# Patient Record
Sex: FEMALE | Race: WHITE | NOT HISPANIC OR LATINO | URBAN - METROPOLITAN AREA
[De-identification: names, ages, dates, MRNs, and addresses within clinical notes are randomized per-mention and may not be internally consistent; named-entity substitution may affect disease eponyms.]

---

## 2018-01-23 NOTE — PATIENT DISCUSSION
(H25.13) Age-related nuclear cataract, bilateral - Assesment : Examination revealed cataract.  OU: 2.5+ [facility] BRUNESCENT  PATIENT LIKES TO PLAY TENNIS AND WATCHES TV.  RECOMMEND FOCUSING OU AT DISTANCE AND WEAR GLASSES FOR READING. - Plan : SX COUNSELING. -[address] [facility]

## 2018-01-23 NOTE — PATIENT DISCUSSION
(H35.373) Puckering of macula, bilateral - Assesment : Examination revealed ERM. DISCUSSED DX WITH PATIENT. ADVISED PATIENT MAY SEE SOME DISTORTION AFTER CATARACT SURGERY DUE TO ERM. INCREASED CLARITY AFTER CATARACT SURGERY MAY REVEAL SOME DISTORTION. - Plan : Monitor. Advised patient to call our office with decreased vision or increased symptoms.

## 2018-01-23 NOTE — PATIENT DISCUSSION
(Q02.529) Vitreous degeneration, bilateral - Assesment : Examination revealed PVD - Plan : Monitor for changes. Advised patient to call our office with decreased vision or an increase in flashes and/or floaters.

## 2018-04-23 NOTE — PATIENT DISCUSSION
(Z96.1) Presence of intraocular lens - Assesment : Patient is Pseudophakic. - Plan : Signs and symptoms of infection and retinal detachment are outlined in your surgical packet. Do not rub operated eye. Follow drop schedule. If redness, pain, decreased vision, flashes or floaters occur then contact clinic.  Recommend artificial tears 3-4 times a day Rtc 01/2019 Exam-octm

## 2018-05-18 NOTE — PATIENT DISCUSSION
REASSURANCE GIVEN, NO FB NOTED,  NOTICING CHEMOSIS AND PING. MOVING AROUND. LOTEMAX QID 1 WEEK TO CALM THINGS DOWN.   CALLBACK INSTRUCTIONS GIVEN

## 2018-10-31 ENCOUNTER — IMPORTED ENCOUNTER (OUTPATIENT)
Dept: URBAN - METROPOLITAN AREA CLINIC 31 | Facility: CLINIC | Age: 73
End: 2018-10-31

## 2018-10-31 PROBLEM — H25.813: Noted: 2018-10-31

## 2018-10-31 PROBLEM — H04.123: Noted: 2018-10-31

## 2018-10-31 PROBLEM — E10.9: Noted: 2018-10-31

## 2018-10-31 PROCEDURE — 92014 COMPRE OPH EXAM EST PT 1/>: CPT

## 2018-10-31 NOTE — PATIENT DISCUSSION
1.  DM I without sign of Diabetic Retinopathy OU:  Discussed the pathophysiology of diabetes and its effect on the eye. Stressed the importance of regular followup and good control of BS BP and Lipids to avoid future complications. 2. Combined Types of Cataract OU: Explained how cataracts can effect vision. Recommend clinical observation. The patient was advised to contact us if any change or worsening of vision. 3. Dry Eye OU:  Continue current management with Artificial Tears. 4.  Return for an appointment for comprehensive exam. with Dr. Jaye Mendez.

## 2019-01-24 NOTE — PATIENT DISCUSSION
(H35.373) Puckering of macula, bilateral - Assesment : Examination revealed ERM. - Plan : Monitor. Advised patient to call our office with decreased vision or increased symptoms. WILL TRY GLASSES FOR NIGHT DRIVING.   NEW RX GIVEN TO PATIENT TODAY  1 YEAR EXAM

## 2019-03-26 ENCOUNTER — IMPORTED ENCOUNTER (OUTPATIENT)
Dept: URBAN - METROPOLITAN AREA CLINIC 31 | Facility: CLINIC | Age: 74
End: 2019-03-26

## 2019-03-26 PROBLEM — H11.32: Noted: 2019-03-26

## 2019-03-26 PROBLEM — E11.9: Noted: 2019-03-26

## 2019-03-26 PROBLEM — H25.13: Noted: 2019-03-26

## 2019-03-26 PROCEDURE — 99214 OFFICE O/P EST MOD 30 MIN: CPT

## 2019-03-26 NOTE — PATIENT DISCUSSION
1.  1.  Subconjunctival hemorrhage OS --The condition was discussed with patient and the patient was reassured. The patient was asked to notify his family physician should he have increased bleeding or bruising elsewhere or recurrent subconjunctival hemorrhages. 2. Nuclear Sclerotic Cataract OU: Explained how cataracts can effect vision. Recommend clinical observation. The patient was advised to contact us if any change or worsening of vision. 3.  DM II without sign of Diabetic Retinopathy OU:  Discussed the pathophysiology of diabetes and its effect on the eye. Stressed the importance of regular followup and good control of BS BP and Lipids to avoid future complications. /u with Malena Chao in few months CE

## 2019-07-11 NOTE — PATIENT DISCUSSION
(Z96.1) Presence of intraocular lens - Assesment : Patient is Pseudophakic. - Plan : Signs and symptoms of infection and retinal detachment are outlined in your surgical packet. Do not rub operated eye. Follow drop schedule. If redness, pain, decreased vision, flashes or floaters occur then contact clinic.  ATS OS FOR FB SENSATION PRN  1 MONTH/ REFRACT/ MAC OCT
no

## 2019-11-04 ENCOUNTER — IMPORTED ENCOUNTER (OUTPATIENT)
Dept: URBAN - METROPOLITAN AREA CLINIC 31 | Facility: CLINIC | Age: 74
End: 2019-11-04

## 2019-11-04 PROBLEM — H25.13: Noted: 2019-11-04

## 2019-11-04 PROBLEM — E10.9: Noted: 2019-11-04

## 2019-11-04 PROBLEM — H04.123: Noted: 2019-11-04

## 2019-11-04 PROCEDURE — 92014 COMPRE OPH EXAM EST PT 1/>: CPT

## 2019-11-04 NOTE — PATIENT DISCUSSION
1.  DM I without sign of Diabetic Retinopathy OU:  Discussed the pathophysiology of diabetes and its effect on the eye. Stressed the importance of regular followup and good control of BS BP and Lipids to avoid future complications. 2. Nuclear Sclerotic Cataract OU: Explained how cataracts can effect vision. Recommend clinical observation. The patient was advised to contact us if any change or worsening of vision. 3. Dry Eye OU:  Continue current management with Artificial Tears. 4.  Return for an appointment in 1 year for comprehensive exam. with Dr. Douglas Barrios.

## 2020-11-04 ENCOUNTER — IMPORTED ENCOUNTER (OUTPATIENT)
Dept: URBAN - METROPOLITAN AREA CLINIC 31 | Facility: CLINIC | Age: 75
End: 2020-11-04

## 2020-11-04 PROBLEM — E10.9: Noted: 2020-11-04

## 2020-11-04 PROBLEM — H04.123: Noted: 2020-11-04

## 2020-11-04 PROBLEM — H25.13: Noted: 2020-11-04

## 2020-11-04 PROCEDURE — 92015 DETERMINE REFRACTIVE STATE: CPT

## 2020-11-04 PROCEDURE — 92014 COMPRE OPH EXAM EST PT 1/>: CPT

## 2020-11-04 NOTE — PATIENT DISCUSSION
1.  DM I without sign of Diabetic Retinopathy OU:  Stable monitor. 2. Nuclear Sclerotic Cataract OU: Monitor. 3. Dry Eye OU:  Continue current management with Artificial Tears. 4.  Return for an appointment in 1 year for comprehensive exam and Optos with Dr. Dorian Díaz.

## 2021-03-04 ENCOUNTER — IMPORTED ENCOUNTER (OUTPATIENT)
Dept: URBAN - METROPOLITAN AREA CLINIC 31 | Facility: CLINIC | Age: 76
End: 2021-03-04

## 2021-03-04 PROBLEM — H04.123: Noted: 2021-03-04

## 2021-03-04 PROBLEM — H25.13: Noted: 2021-03-04

## 2021-03-04 PROBLEM — H00.025: Noted: 2021-03-04

## 2021-03-04 PROBLEM — E10.9: Noted: 2021-03-04

## 2021-03-04 PROCEDURE — 99213 OFFICE O/P EST LOW 20 MIN: CPT

## 2021-03-04 NOTE — PATIENT DISCUSSION
1.  DM I without sign of Diabetic Retinopathy OU:  Stable monitor. 2. Nuclear Sclerotic Cataract OU: Monitor. 3. Dry Eye OU:  Continue current management with Artificial Tears. 4.  Return for an appointment in 8 months for comprehensive exam and Optos with Dr. Angelo Low.

## 2021-03-04 NOTE — PATIENT DISCUSSION
1.  Internal hordeolum LLL nasally - WC massage QID. If not resolved in 3 weeks or any worsening to call.2.  1.  DM I without sign of Diabetic Retinopathy OU:  Stable monitor. 2. Nuclear Sclerotic Cataract OU: Monitor. 3. Dry Eye OU:  Continue current management with Artificial Tears. 4.  Return for an appointment in 8 months for comprehensive exam and Optos with Dr. Jeovany Wade.

## 2021-11-04 ENCOUNTER — IMPORTED ENCOUNTER (OUTPATIENT)
Dept: URBAN - METROPOLITAN AREA CLINIC 31 | Facility: CLINIC | Age: 76
End: 2021-11-04

## 2021-11-04 PROBLEM — H00.025: Noted: 2021-11-04

## 2021-11-04 PROBLEM — H25.13: Noted: 2021-11-04

## 2021-11-04 PROBLEM — H04.123: Noted: 2021-11-04

## 2021-11-04 PROBLEM — E10.9: Noted: 2021-11-04

## 2021-11-04 PROCEDURE — 99214 OFFICE O/P EST MOD 30 MIN: CPT

## 2021-11-04 PROCEDURE — 92250 FUNDUS PHOTOGRAPHY W/I&R: CPT

## 2021-11-04 PROCEDURE — 92015 DETERMINE REFRACTIVE STATE: CPT

## 2021-11-04 NOTE — PATIENT DISCUSSION
1.  DM I without sign of Diabetic Retinopathy OU:  Stable monitor. 2. Nuclear Sclerotic Cataract OU: Progressing. Monitor. 3. Dry Eye OU:  Continue current management with Artificial Tears. 4. Refractive error - update glasses. 5.  Return for an appointment in 12 months for comprehensive exam and Optos with Dr. Stacey Vamra.

## 2021-11-17 ENCOUNTER — IMPORTED ENCOUNTER (OUTPATIENT)
Dept: URBAN - METROPOLITAN AREA CLINIC 31 | Facility: CLINIC | Age: 76
End: 2021-11-17

## 2021-11-17 PROBLEM — H25.13: Noted: 2021-11-17

## 2021-11-17 PROBLEM — E10.9: Noted: 2021-11-17

## 2021-11-17 PROBLEM — H04.123: Noted: 2021-11-17

## 2021-11-17 PROBLEM — H11.32: Noted: 2021-11-17

## 2021-11-17 PROBLEM — H25.12: Noted: 2021-11-17

## 2021-11-17 PROBLEM — H00.025: Noted: 2021-11-17

## 2021-11-17 PROBLEM — H25.11: Noted: 2021-11-17

## 2021-11-17 PROCEDURE — 99213 OFFICE O/P EST LOW 20 MIN: CPT

## 2021-11-17 NOTE — PATIENT DISCUSSION
1.  Subconjunctival hemorrhage OS --The condition was discussed with patient and the patient was reassured. The patient was asked to notify his family physician should he have increased bleeding or bruising elsewhere or recurrent subconjunctival hemorrhages. 2. DM I without sign of Diabetic Retinopathy OU:  Stable monitor. 3. Nuclear Sclerotic Cataract OU: Progressing. Monitor. 4. Dry Eye OU:  Continue current management with Artificial Tears. 5. Return for an appointment in 12 months for comprehensive exam and Optos with Dr. Rei Lara.

## 2021-11-17 NOTE — PATIENT DISCUSSION
1.  DM I without sign of Diabetic Retinopathy OU:  Stable monitor. 2. Nuclear Sclerotic Cataract OU: Progressing. Monitor. 3. Dry Eye OU:  Continue current management with Artificial Tears. 5. Return for an appointment in 12 months for comprehensive exam and Optos with Dr. Celine Armendariz.

## 2022-03-10 NOTE — PROCEDURE NOTE: CLINICAL
PROCEDURE NOTE: Punctal Plugs, Quintess Dissolvable ([id number], [id number]) Bilateral Upper Lids. Diagnosis: Dry Eye Syndrome. Prior to treatment, the risks/benefits/alternatives were discussed. The patient wished to proceed with procedure. Temporary collagen plugs were inserted. Patient tolerated procedure well. There were no complications. Post procedure instructions given. Candida Chow

## 2022-03-10 NOTE — PATIENT DISCUSSION
(H35.373) Puckering of macula, bilateral - Assesment : Examination revealed ERM. - Plan : Monitor. Advised patient to call our office with decreased vision or increased symptoms. WILL TRY GLASSES FOR NIGHT DRIVING.   NEW RX GIVEN TO PATIENT TODAY  1 YEAR EXAM.

## 2022-04-02 ASSESSMENT — VISUAL ACUITY
OS_SC: 20/40+2
OD_SC: 20/40+1
OS_SC: 20/30
OS_SC: 20/40+1
OD_SC: 20/20-2
OS_PH: CC 20/25 +2
OS_GLARE: 20/800MED
OD_PH: CC 20/25 -3
OS_SC: 20/30+2
OD_GLARE: 20/800MED
OD_SC: 20/20
OD_SC: 20/30+1
OS_SC: 20/25-3
OS_SC: 20/30-2
OD_SC: 20/25-1
OD_SC: 20/20-2
OS_PH: CC 20/30 +2

## 2022-04-02 ASSESSMENT — TONOMETRY
OS_IOP_MMHG: 13
OD_IOP_MMHG: 14
OS_IOP_MMHG: 14
OD_IOP_MMHG: 14
OD_IOP_MMHG: 14
OS_IOP_MMHG: 16
OS_IOP_MMHG: 14
OS_IOP_MMHG: 14
OD_IOP_MMHG: 15
OD_IOP_MMHG: 17

## 2022-07-09 ENCOUNTER — TELEPHONE ENCOUNTER (OUTPATIENT)
Dept: URBAN - METROPOLITAN AREA CLINIC 121 | Facility: CLINIC | Age: 77
End: 2022-07-09

## 2022-07-10 ENCOUNTER — TELEPHONE ENCOUNTER (OUTPATIENT)
Dept: URBAN - METROPOLITAN AREA CLINIC 121 | Facility: CLINIC | Age: 77
End: 2022-07-10

## 2022-07-10 RX ORDER — CALCIUM CARB/VIT D2/MINERALS
TABLET ORAL ONCE A DAY
Refills: 0 | Status: ACTIVE | COMMUNITY
Start: 2017-07-10

## 2022-07-10 RX ORDER — ACETAMINOPHEN, DEXTROMETHORPHAN HBR, DOXYLAMINE SUCCINATE, PHENYLEPHRINE HCL 10-5-325MG
KIT ORAL THREE TIMES A DAY
Refills: 0 | Status: ACTIVE | COMMUNITY
Start: 2017-07-10

## 2022-07-10 RX ORDER — INSULIN ASPART 100 [IU]/ML
5 UNITS TID INJECTION, SUSPENSION SUBCUTANEOUS
Refills: 0 | Status: ACTIVE | COMMUNITY
Start: 2017-07-10

## 2022-07-10 RX ORDER — FOSINOPRIL SODIUM 40 MG/1
TABLET ORAL ONCE A DAY
Refills: 0 | Status: ACTIVE | COMMUNITY
Start: 2017-07-10

## 2022-07-10 RX ORDER — METFORMIN HCL 500 MG/1
TABLET ORAL ONCE A DAY
Refills: 0 | Status: ACTIVE | COMMUNITY
Start: 2017-07-10

## 2022-07-10 RX ORDER — INSULIN GLARGINE 100 [IU]/ML
15 UNITS QH INJECTION, SOLUTION SUBCUTANEOUS
Refills: 0 | Status: ACTIVE | COMMUNITY
Start: 2017-07-10

## 2022-07-10 RX ORDER — ASPIRIN 81 MG/1
TABLET, COATED ORAL ONCE A DAY
Refills: 0 | Status: ACTIVE | COMMUNITY
Start: 2017-07-10

## 2022-10-26 ENCOUNTER — PREPPED CHART (OUTPATIENT)
Dept: URBAN - METROPOLITAN AREA CLINIC 29 | Facility: CLINIC | Age: 77
End: 2022-10-26

## 2022-11-03 ENCOUNTER — COMPREHENSIVE EXAM (OUTPATIENT)
Dept: URBAN - METROPOLITAN AREA CLINIC 29 | Facility: CLINIC | Age: 77
End: 2022-11-03

## 2022-11-03 DIAGNOSIS — E10.9: ICD-10-CM

## 2022-11-03 DIAGNOSIS — H04.123: ICD-10-CM

## 2022-11-03 DIAGNOSIS — H25.13: ICD-10-CM

## 2022-11-03 DIAGNOSIS — H52.223: ICD-10-CM

## 2022-11-03 DIAGNOSIS — H52.03: ICD-10-CM

## 2022-11-03 PROCEDURE — 99214 OFFICE O/P EST MOD 30 MIN: CPT

## 2022-11-03 PROCEDURE — 92015 DETERMINE REFRACTIVE STATE: CPT

## 2022-11-03 ASSESSMENT — TONOMETRY
OS_IOP_MMHG: 12
OD_IOP_MMHG: 11

## 2022-11-03 ASSESSMENT — VISUAL ACUITY
OD_CC: 20/20-2
OS_CC: 20/20-1

## 2024-01-09 ENCOUNTER — COMPREHENSIVE EXAM (OUTPATIENT)
Dept: URBAN - METROPOLITAN AREA CLINIC 29 | Facility: CLINIC | Age: 79
End: 2024-01-09

## 2024-01-09 DIAGNOSIS — H52.03: ICD-10-CM

## 2024-01-09 DIAGNOSIS — H04.123: ICD-10-CM

## 2024-01-09 DIAGNOSIS — H25.13: ICD-10-CM

## 2024-01-09 DIAGNOSIS — E10.9: ICD-10-CM

## 2024-01-09 DIAGNOSIS — H52.223: ICD-10-CM

## 2024-01-09 PROCEDURE — 92014 COMPRE OPH EXAM EST PT 1/>: CPT

## 2024-01-09 PROCEDURE — 92015 DETERMINE REFRACTIVE STATE: CPT

## 2024-01-09 ASSESSMENT — TONOMETRY
OD_IOP_MMHG: 13
OS_IOP_MMHG: 14

## 2024-01-09 ASSESSMENT — VISUAL ACUITY
OS_SC: 20/30-2
OD_SC: 20/30-2

## 2025-01-09 ENCOUNTER — COMPREHENSIVE EXAM (OUTPATIENT)
Age: 80
End: 2025-01-09

## 2025-01-09 DIAGNOSIS — H52.03: ICD-10-CM

## 2025-01-09 DIAGNOSIS — H04.123: ICD-10-CM

## 2025-01-09 DIAGNOSIS — E10.9: ICD-10-CM

## 2025-01-09 DIAGNOSIS — H25.13: ICD-10-CM

## 2025-01-09 DIAGNOSIS — H52.223: ICD-10-CM

## 2025-01-09 PROCEDURE — 92014 COMPRE OPH EXAM EST PT 1/>: CPT

## 2025-01-09 PROCEDURE — 92015 DETERMINE REFRACTIVE STATE: CPT

## 2025-07-01 ENCOUNTER — CONSULTATION/EVALUATION (OUTPATIENT)
Age: 80
End: 2025-07-01

## 2025-07-01 DIAGNOSIS — H25.13: ICD-10-CM

## 2025-07-01 DIAGNOSIS — E10.9: ICD-10-CM

## 2025-07-01 DIAGNOSIS — H04.123: ICD-10-CM

## 2025-07-01 PROCEDURE — 99214 OFFICE O/P EST MOD 30 MIN: CPT

## 2025-07-01 PROCEDURE — 92134 CPTRZ OPH DX IMG PST SGM RTA: CPT | Mod: NC

## 2025-07-01 PROCEDURE — 92136 OPHTHALMIC BIOMETRY: CPT

## 2025-08-28 ENCOUNTER — SURGERY/PROCEDURE (OUTPATIENT)
Age: 80
End: 2025-08-28

## 2025-08-28 DIAGNOSIS — H25.12: ICD-10-CM

## 2025-08-28 PROCEDURE — 66984 XCAPSL CTRC RMVL W/O ECP: CPT

## 2025-08-29 ENCOUNTER — POST-OP (OUTPATIENT)
Age: 80
End: 2025-08-29

## 2025-08-29 DIAGNOSIS — Z96.1: ICD-10-CM

## 2025-08-29 DIAGNOSIS — H25.11: ICD-10-CM
